# Patient Record
Sex: MALE | Race: ASIAN | NOT HISPANIC OR LATINO | URBAN - METROPOLITAN AREA
[De-identification: names, ages, dates, MRNs, and addresses within clinical notes are randomized per-mention and may not be internally consistent; named-entity substitution may affect disease eponyms.]

---

## 2019-12-13 VITALS
SYSTOLIC BLOOD PRESSURE: 130 MMHG | HEART RATE: 75 BPM | WEIGHT: 149.91 LBS | OXYGEN SATURATION: 97 % | TEMPERATURE: 97 F | RESPIRATION RATE: 16 BRPM | DIASTOLIC BLOOD PRESSURE: 77 MMHG | HEIGHT: 65 IN

## 2019-12-13 NOTE — H&P ADULT - HISTORY OF PRESENT ILLNESS
53yo male with PMHx of HTN, IDDM, MI 2/2016 (s/p PCI of LAD/OM, w/ residual RCA disease but never followed up) who presents to the cardiologist Dr. Mata with c/o chest pain.  Chest pain described as intermittent pain unrelated to activity, lasting minutes. ** confirm patients home medications    53 yo male, former smoker, PMHX HTN, hyperlipidemia, NIDDM type II, known CAD s/p STEMI in 2/20/16 tx with TIMMY to Daniella @  and presented to [facility] for staged PCI 11/28/16 receiving a TIMMY to OM1 w/ residual RCA disease and was recommended to return for PCI but patient was lost to follow up who now presents to Dr. Mata for routine follow up.  Per patient he has infrequent episodes of midsternal chest burning, unrelated to activity, which he relates to acid reflux.  Otherwise denies SOB, palpitations, diaphoresis, LE swelling, orthopnea, PND, fevers, chills.  No stress testing done.  In light of patients risk factors, and known RCA disease he now presents for cardiac catheterization and intervention of RCA disease.      Cath Hx  - s/p Cardiac Cath @ [facility] 11/28/2016: LM normal, LAD patent stent, OM 85%, mRCA 90%, EF 55, s/p successful TIMMY to OM1, recommended to return for staged PCI of RCA in 5 weeks. 55 yo male, former smoker, PMHX HTN, hyperlipidemia, NIDDM type II, known CAD s/p STEMI in 2/20/16 tx with TIMMY to Daniella @  and presented to [facility] for staged PCI 11/28/16 receiving a TIMMY to OM1 w/ residual RCA disease and was recommended to return for PCI but patient was lost to follow up who now presents to Dr. Mata for routine follow up.  Per patient he has infrequent episodes of midsternal chest burning, unrelated to activity, which he relates to acid reflux.  Otherwise denies SOB, palpitations, diaphoresis, LE swelling, orthopnea, PND, fevers, chills.  No stress testing done.  In light of patients risk factors, and known RCA disease he now presents for cardiac catheterization and intervention of RCA disease.      Cath Hx  - s/p Cardiac Cath @ [facility] 11/28/2016: LM normal, LAD patent stent, OM 85%, mRCA 90%, EF 55, s/p successful TIMMY to OM1, recommended to return for staged PCI of RCA in 5 weeks. 55 yo male, former smoker, PMHX HTN, hyperlipidemia, NIDDM type II, known CAD s/p STEMI in 2/20/16 tx with TIMMY to Daniella @  and presented to [facility] for staged PCI 11/28/16 receiving a TIMMY to OM1 w/ residual RCA disease and was recommended to return for PCI but patient was lost to follow up who now presents to Dr. Mata for routine follow up.  Per patient he has worsening episodes of midsternal chest burning, unrelated to activity, unstable angina.  Otherwise denies SOB, palpitations, diaphoresis, LE swelling, orthopnea, PND, fevers, chills.  No stress testing done.  In light of patients risk factors, unstable angina and known RCA disease he now presents for cardiac catheterization and intervention of RCA disease.      Cath Hx  - s/p Cardiac Cath @ [facility] 11/28/2016: LM normal, LAD patent stent, OM 85%, mRCA 90%, EF 55, s/p successful TIMMY to OM1, recommended to return for staged PCI of RCA in 5 weeks.

## 2019-12-13 NOTE — H&P ADULT - NSICDXPASTMEDICALHX_GEN_ALL_CORE_FT
PAST MEDICAL HISTORY:  CAD (coronary artery disease) s/p STEMI in 2/2016    Diabetes     Hyperlipidemia     Hypertension

## 2019-12-13 NOTE — H&P ADULT - NSHPLABSRESULTS_GEN_ALL_CORE
EKG: NSR @ 75 BPM with TWI in III, AVF 15.9   5.35  )-----------( 262      ( 17 Dec 2019 07:33 )             47.2       EKG: NSR @ 75 BPM with TWI in III, AVF

## 2019-12-13 NOTE — H&P ADULT - ASSESSMENT
55 yo male, former smoker, PMHX HTN, hyperlipidemia, NIDDM type II, known CAD s/p STEMI in 2/20/16 tx with TIMMY to Daniella @  and presented to [facility] for staged PCI 11/28/16 receiving a TIMMY to OM1 w/ residual RCA disease and was recommended to return for PCI but patient was lost to follow up who now presents to Dr. Mata for routine follow up.  In light of patients risk factors, and known RCA disease he now presents for cardiac catheterization and intervention of RCA disease.      ASA III, Mallampati IV    Pt denies bleeding, GI bleeding, hematemesis, hematuria, BRBPR or melena. Last took ASA/Effient on Bo 12/15/19 but otherwise endorses compliance with DAPT.   Stopped Atorvastatin 2 months ago 2/2 diarrhea. Patient states he was planning on discussing statin regimen with Dr. Mata post-cath.   EF 55% by prior cath 11/28/16:  IV NS@ 75 cc/hr pre-cath.  Sedation Plan: Moderate  Patient is Suitable Candidate for Sedation: Yes  Risks & benefits of procedure and alternative therapy have been explained to the patient including but not limited to: allergic reaction, bleeding w/possible need for blood transfusion, infection, renal and vascular compromise, limb damage, arrhythmia, stroke, vessel dissection/perforation, Myocardial infarction, emergent CABG. Informed consent obtained and in chart 53 yo male, former smoker, PMHX HTN, hyperlipidemia, NIDDM type II, known CAD s/p STEMI in 2/20/16 tx with TIMMY to Daniella @  and presented to [facility] for staged PCI 11/28/16 receiving a TIMMY to OM1 w/ residual RCA disease and was recommended to return for PCI but patient was lost to follow up. Per patient he has worsening episodes of midsternal chest burning, unrelated to activity, c/w unstable angina.  Otherwise denies SOB, palpitations, diaphoresis, LE swelling, orthopnea, PND, fevers, chills.  No stress testing done.  In light of patients risk factors, unstable angina and known RCA disease he now presents for cardiac catheterization and intervention of RCA disease.         ASA III, Mallampati IV    Pt denies bleeding, GI bleeding, hematemesis, hematuria, BRBPR or melena. Last took ASA/Effient on Bo 12/15/19 but otherwise endorses compliance with DAPT.   Stopped Atorvastatin 2 months ago 2/2 diarrhea. Patient states he was planning on discussing statin regimen with Dr. Mata post-cath.   EF 55% by prior cath 11/28/16:  IV NS@ 75 cc/hr pre-cath.  Sedation Plan: Moderate  Patient is Suitable Candidate for Sedation: Yes  Risks & benefits of procedure and alternative therapy have been explained to the patient including but not limited to: allergic reaction, bleeding w/possible need for blood transfusion, infection, renal and vascular compromise, limb damage, arrhythmia, stroke, vessel dissection/perforation, Myocardial infarction, emergent CABG. Informed consent obtained and in chart

## 2019-12-17 ENCOUNTER — INPATIENT (INPATIENT)
Facility: HOSPITAL | Age: 54
LOS: 0 days | Discharge: HOME | DRG: 247 | End: 2019-12-18
Attending: INTERNAL MEDICINE | Admitting: INTERNAL MEDICINE
Payer: COMMERCIAL

## 2019-12-17 DIAGNOSIS — Z90.49 ACQUIRED ABSENCE OF OTHER SPECIFIED PARTS OF DIGESTIVE TRACT: Chronic | ICD-10-CM

## 2019-12-17 LAB
ALBUMIN SERPL ELPH-MCNC: 4.6 G/DL — SIGNIFICANT CHANGE UP (ref 3.3–5)
ALP SERPL-CCNC: 68 U/L — SIGNIFICANT CHANGE UP (ref 40–120)
ALT FLD-CCNC: 135 U/L — HIGH (ref 10–45)
ANION GAP SERPL CALC-SCNC: 12 MMOL/L — SIGNIFICANT CHANGE UP (ref 5–17)
APTT BLD: 31.7 SEC — SIGNIFICANT CHANGE UP (ref 27.5–36.3)
AST SERPL-CCNC: 83 U/L — HIGH (ref 10–40)
BASOPHILS # BLD AUTO: 0.03 K/UL — SIGNIFICANT CHANGE UP (ref 0–0.2)
BASOPHILS NFR BLD AUTO: 0.6 % — SIGNIFICANT CHANGE UP (ref 0–2)
BILIRUB SERPL-MCNC: 0.6 MG/DL — SIGNIFICANT CHANGE UP (ref 0.2–1.2)
BUN SERPL-MCNC: 15 MG/DL — SIGNIFICANT CHANGE UP (ref 7–23)
CALCIUM SERPL-MCNC: 9.7 MG/DL — SIGNIFICANT CHANGE UP (ref 8.4–10.5)
CHLORIDE SERPL-SCNC: 99 MMOL/L — SIGNIFICANT CHANGE UP (ref 96–108)
CHOLEST SERPL-MCNC: 247 MG/DL — HIGH (ref 10–199)
CK MB CFR SERPL CALC: <1 NG/ML — SIGNIFICANT CHANGE UP (ref 0–6.7)
CK SERPL-CCNC: 116 U/L — SIGNIFICANT CHANGE UP (ref 30–200)
CO2 SERPL-SCNC: 27 MMOL/L — SIGNIFICANT CHANGE UP (ref 22–31)
CREAT SERPL-MCNC: 1.13 MG/DL — SIGNIFICANT CHANGE UP (ref 0.5–1.3)
EOSINOPHIL # BLD AUTO: 0.13 K/UL — SIGNIFICANT CHANGE UP (ref 0–0.5)
EOSINOPHIL NFR BLD AUTO: 2.4 % — SIGNIFICANT CHANGE UP (ref 0–6)
GLUCOSE BLDC GLUCOMTR-MCNC: 203 MG/DL — HIGH (ref 70–99)
GLUCOSE BLDC GLUCOMTR-MCNC: 230 MG/DL — HIGH (ref 70–99)
GLUCOSE BLDC GLUCOMTR-MCNC: 243 MG/DL — HIGH (ref 70–99)
GLUCOSE BLDC GLUCOMTR-MCNC: 248 MG/DL — HIGH (ref 70–99)
GLUCOSE BLDC GLUCOMTR-MCNC: 259 MG/DL — HIGH (ref 70–99)
GLUCOSE SERPL-MCNC: 265 MG/DL — HIGH (ref 70–99)
HBA1C BLD-MCNC: 9.9 % — HIGH (ref 4–5.6)
HCT VFR BLD CALC: 47.2 % — SIGNIFICANT CHANGE UP (ref 39–50)
HDLC SERPL-MCNC: 32 MG/DL — LOW
HGB BLD-MCNC: 15.9 G/DL — SIGNIFICANT CHANGE UP (ref 13–17)
IMM GRANULOCYTES NFR BLD AUTO: 0.2 % — SIGNIFICANT CHANGE UP (ref 0–1.5)
INR BLD: 1.08 — SIGNIFICANT CHANGE UP (ref 0.88–1.16)
LIPID PNL WITH DIRECT LDL SERPL: 157 MG/DL — HIGH
LYMPHOCYTES # BLD AUTO: 1.82 K/UL — SIGNIFICANT CHANGE UP (ref 1–3.3)
LYMPHOCYTES # BLD AUTO: 34 % — SIGNIFICANT CHANGE UP (ref 13–44)
MCHC RBC-ENTMCNC: 29.9 PG — SIGNIFICANT CHANGE UP (ref 27–34)
MCHC RBC-ENTMCNC: 33.7 GM/DL — SIGNIFICANT CHANGE UP (ref 32–36)
MCV RBC AUTO: 88.7 FL — SIGNIFICANT CHANGE UP (ref 80–100)
MONOCYTES # BLD AUTO: 0.5 K/UL — SIGNIFICANT CHANGE UP (ref 0–0.9)
MONOCYTES NFR BLD AUTO: 9.3 % — SIGNIFICANT CHANGE UP (ref 2–14)
NEUTROPHILS # BLD AUTO: 2.86 K/UL — SIGNIFICANT CHANGE UP (ref 1.8–7.4)
NEUTROPHILS NFR BLD AUTO: 53.5 % — SIGNIFICANT CHANGE UP (ref 43–77)
NRBC # BLD: 0 /100 WBCS — SIGNIFICANT CHANGE UP (ref 0–0)
PLATELET # BLD AUTO: 262 K/UL — SIGNIFICANT CHANGE UP (ref 150–400)
POTASSIUM SERPL-MCNC: 4.2 MMOL/L — SIGNIFICANT CHANGE UP (ref 3.5–5.3)
POTASSIUM SERPL-SCNC: 4.2 MMOL/L — SIGNIFICANT CHANGE UP (ref 3.5–5.3)
PROT SERPL-MCNC: 7.6 G/DL — SIGNIFICANT CHANGE UP (ref 6–8.3)
PROTHROM AB SERPL-ACNC: 12.2 SEC — SIGNIFICANT CHANGE UP (ref 10–12.9)
RBC # BLD: 5.32 M/UL — SIGNIFICANT CHANGE UP (ref 4.2–5.8)
RBC # FLD: 12.7 % — SIGNIFICANT CHANGE UP (ref 10.3–14.5)
SODIUM SERPL-SCNC: 138 MMOL/L — SIGNIFICANT CHANGE UP (ref 135–145)
TOTAL CHOLESTEROL/HDL RATIO MEASUREMENT: 7.7 RATIO — SIGNIFICANT CHANGE UP (ref 3.4–9.6)
TRIGL SERPL-MCNC: 289 MG/DL — HIGH (ref 10–149)
WBC # BLD: 5.35 K/UL — SIGNIFICANT CHANGE UP (ref 3.8–10.5)
WBC # FLD AUTO: 5.35 K/UL — SIGNIFICANT CHANGE UP (ref 3.8–10.5)

## 2019-12-17 PROCEDURE — 93458 L HRT ARTERY/VENTRICLE ANGIO: CPT | Mod: 26,59

## 2019-12-17 PROCEDURE — 93010 ELECTROCARDIOGRAM REPORT: CPT

## 2019-12-17 PROCEDURE — 92928 PRQ TCAT PLMT NTRAC ST 1 LES: CPT | Mod: 59,RC

## 2019-12-17 PROCEDURE — 93010 ELECTROCARDIOGRAM REPORT: CPT | Mod: 77

## 2019-12-17 RX ORDER — SODIUM CHLORIDE 9 MG/ML
500 INJECTION INTRAMUSCULAR; INTRAVENOUS; SUBCUTANEOUS
Refills: 0 | Status: DISCONTINUED | OUTPATIENT
Start: 2019-12-17 | End: 2019-12-17

## 2019-12-17 RX ORDER — INSULIN LISPRO 100/ML
VIAL (ML) SUBCUTANEOUS
Refills: 0 | Status: DISCONTINUED | OUTPATIENT
Start: 2019-12-17 | End: 2019-12-18

## 2019-12-17 RX ORDER — SODIUM CHLORIDE 9 MG/ML
1000 INJECTION, SOLUTION INTRAVENOUS
Refills: 0 | Status: DISCONTINUED | OUTPATIENT
Start: 2019-12-17 | End: 2019-12-17

## 2019-12-17 RX ORDER — INFLUENZA VIRUS VACCINE 15; 15; 15; 15 UG/.5ML; UG/.5ML; UG/.5ML; UG/.5ML
0.5 SUSPENSION INTRAMUSCULAR ONCE
Refills: 0 | Status: COMPLETED | OUTPATIENT
Start: 2019-12-17 | End: 2019-12-17

## 2019-12-17 RX ORDER — PRASUGREL 5 MG/1
60 TABLET, FILM COATED ORAL ONCE
Refills: 0 | Status: COMPLETED | OUTPATIENT
Start: 2019-12-17 | End: 2019-12-17

## 2019-12-17 RX ORDER — INSULIN LISPRO 100/ML
VIAL (ML) SUBCUTANEOUS ONCE
Refills: 0 | Status: COMPLETED | OUTPATIENT
Start: 2019-12-17 | End: 2019-12-17

## 2019-12-17 RX ORDER — METOPROLOL TARTRATE 50 MG
50 TABLET ORAL DAILY
Refills: 0 | Status: DISCONTINUED | OUTPATIENT
Start: 2019-12-17 | End: 2019-12-18

## 2019-12-17 RX ORDER — ACETAMINOPHEN 500 MG
650 TABLET ORAL ONCE
Refills: 0 | Status: COMPLETED | OUTPATIENT
Start: 2019-12-17 | End: 2019-12-17

## 2019-12-17 RX ORDER — DEXTROSE 50 % IN WATER 50 %
25 SYRINGE (ML) INTRAVENOUS ONCE
Refills: 0 | Status: DISCONTINUED | OUTPATIENT
Start: 2019-12-17 | End: 2019-12-17

## 2019-12-17 RX ORDER — GLUCAGON INJECTION, SOLUTION 0.5 MG/.1ML
1 INJECTION, SOLUTION SUBCUTANEOUS ONCE
Refills: 0 | Status: DISCONTINUED | OUTPATIENT
Start: 2019-12-17 | End: 2019-12-18

## 2019-12-17 RX ORDER — DEXTROSE 50 % IN WATER 50 %
12.5 SYRINGE (ML) INTRAVENOUS ONCE
Refills: 0 | Status: DISCONTINUED | OUTPATIENT
Start: 2019-12-17 | End: 2019-12-18

## 2019-12-17 RX ORDER — GLUCAGON INJECTION, SOLUTION 0.5 MG/.1ML
1 INJECTION, SOLUTION SUBCUTANEOUS ONCE
Refills: 0 | Status: DISCONTINUED | OUTPATIENT
Start: 2019-12-17 | End: 2019-12-17

## 2019-12-17 RX ORDER — SODIUM CHLORIDE 9 MG/ML
1000 INJECTION, SOLUTION INTRAVENOUS
Refills: 0 | Status: DISCONTINUED | OUTPATIENT
Start: 2019-12-17 | End: 2019-12-18

## 2019-12-17 RX ORDER — LISINOPRIL 2.5 MG/1
5 TABLET ORAL DAILY
Refills: 0 | Status: DISCONTINUED | OUTPATIENT
Start: 2019-12-18 | End: 2019-12-18

## 2019-12-17 RX ORDER — AMLODIPINE BESYLATE 2.5 MG/1
2.5 TABLET ORAL DAILY
Refills: 0 | Status: DISCONTINUED | OUTPATIENT
Start: 2019-12-17 | End: 2019-12-18

## 2019-12-17 RX ORDER — DEXTROSE 50 % IN WATER 50 %
12.5 SYRINGE (ML) INTRAVENOUS ONCE
Refills: 0 | Status: DISCONTINUED | OUTPATIENT
Start: 2019-12-17 | End: 2019-12-17

## 2019-12-17 RX ORDER — ASPIRIN/CALCIUM CARB/MAGNESIUM 324 MG
81 TABLET ORAL DAILY
Refills: 0 | Status: DISCONTINUED | OUTPATIENT
Start: 2019-12-18 | End: 2019-12-18

## 2019-12-17 RX ORDER — DEXTROSE 50 % IN WATER 50 %
25 SYRINGE (ML) INTRAVENOUS ONCE
Refills: 0 | Status: DISCONTINUED | OUTPATIENT
Start: 2019-12-17 | End: 2019-12-18

## 2019-12-17 RX ORDER — ATORVASTATIN CALCIUM 80 MG/1
80 TABLET, FILM COATED ORAL ONCE
Refills: 0 | Status: DISCONTINUED | OUTPATIENT
Start: 2019-12-17 | End: 2019-12-17

## 2019-12-17 RX ORDER — ATORVASTATIN CALCIUM 80 MG/1
80 TABLET, FILM COATED ORAL AT BEDTIME
Refills: 0 | Status: DISCONTINUED | OUTPATIENT
Start: 2019-12-17 | End: 2019-12-18

## 2019-12-17 RX ORDER — PRASUGREL 5 MG/1
10 TABLET, FILM COATED ORAL DAILY
Refills: 0 | Status: DISCONTINUED | OUTPATIENT
Start: 2019-12-18 | End: 2019-12-18

## 2019-12-17 RX ORDER — ASPIRIN/CALCIUM CARB/MAGNESIUM 324 MG
325 TABLET ORAL ONCE
Refills: 0 | Status: COMPLETED | OUTPATIENT
Start: 2019-12-17 | End: 2019-12-17

## 2019-12-17 RX ORDER — DEXTROSE 50 % IN WATER 50 %
15 SYRINGE (ML) INTRAVENOUS ONCE
Refills: 0 | Status: DISCONTINUED | OUTPATIENT
Start: 2019-12-17 | End: 2019-12-18

## 2019-12-17 RX ORDER — DEXTROSE 50 % IN WATER 50 %
15 SYRINGE (ML) INTRAVENOUS ONCE
Refills: 0 | Status: DISCONTINUED | OUTPATIENT
Start: 2019-12-17 | End: 2019-12-17

## 2019-12-17 RX ORDER — SODIUM CHLORIDE 9 MG/ML
500 INJECTION INTRAMUSCULAR; INTRAVENOUS; SUBCUTANEOUS
Refills: 0 | Status: DISCONTINUED | OUTPATIENT
Start: 2019-12-17 | End: 2019-12-18

## 2019-12-17 RX ADMIN — SODIUM CHLORIDE 75 MILLILITER(S): 9 INJECTION INTRAMUSCULAR; INTRAVENOUS; SUBCUTANEOUS at 12:10

## 2019-12-17 RX ADMIN — Medication 3: at 13:14

## 2019-12-17 RX ADMIN — Medication 650 MILLIGRAM(S): at 13:05

## 2019-12-17 RX ADMIN — SODIUM CHLORIDE 75 MILLILITER(S): 9 INJECTION INTRAMUSCULAR; INTRAVENOUS; SUBCUTANEOUS at 07:56

## 2019-12-17 RX ADMIN — Medication 50 MILLIGRAM(S): at 13:05

## 2019-12-17 RX ADMIN — ATORVASTATIN CALCIUM 80 MILLIGRAM(S): 80 TABLET, FILM COATED ORAL at 21:38

## 2019-12-17 RX ADMIN — Medication 650 MILLIGRAM(S): at 17:34

## 2019-12-17 RX ADMIN — Medication 325 MILLIGRAM(S): at 07:54

## 2019-12-17 RX ADMIN — Medication 650 MILLIGRAM(S): at 14:05

## 2019-12-17 RX ADMIN — Medication 2: at 17:33

## 2019-12-17 RX ADMIN — PRASUGREL 60 MILLIGRAM(S): 5 TABLET, FILM COATED ORAL at 07:55

## 2019-12-17 RX ADMIN — Medication 2: at 07:55

## 2019-12-17 RX ADMIN — AMLODIPINE BESYLATE 2.5 MILLIGRAM(S): 2.5 TABLET ORAL at 13:05

## 2019-12-17 RX ADMIN — Medication 2: at 21:38

## 2019-12-18 ENCOUNTER — TRANSCRIPTION ENCOUNTER (OUTPATIENT)
Age: 54
End: 2019-12-18

## 2019-12-18 VITALS
RESPIRATION RATE: 17 BRPM | DIASTOLIC BLOOD PRESSURE: 74 MMHG | OXYGEN SATURATION: 98 % | HEART RATE: 73 BPM | SYSTOLIC BLOOD PRESSURE: 124 MMHG

## 2019-12-18 LAB
ANION GAP SERPL CALC-SCNC: 8 MMOL/L — SIGNIFICANT CHANGE UP (ref 5–17)
BUN SERPL-MCNC: 17 MG/DL — SIGNIFICANT CHANGE UP (ref 7–23)
CALCIUM SERPL-MCNC: 9.4 MG/DL — SIGNIFICANT CHANGE UP (ref 8.4–10.5)
CHLORIDE SERPL-SCNC: 100 MMOL/L — SIGNIFICANT CHANGE UP (ref 96–108)
CO2 SERPL-SCNC: 26 MMOL/L — SIGNIFICANT CHANGE UP (ref 22–31)
CREAT SERPL-MCNC: 1.16 MG/DL — SIGNIFICANT CHANGE UP (ref 0.5–1.3)
GLUCOSE BLDC GLUCOMTR-MCNC: 230 MG/DL — HIGH (ref 70–99)
GLUCOSE BLDC GLUCOMTR-MCNC: 296 MG/DL — HIGH (ref 70–99)
GLUCOSE SERPL-MCNC: 268 MG/DL — HIGH (ref 70–99)
HCT VFR BLD CALC: 45.2 % — SIGNIFICANT CHANGE UP (ref 39–50)
HGB BLD-MCNC: 15.4 G/DL — SIGNIFICANT CHANGE UP (ref 13–17)
MAGNESIUM SERPL-MCNC: 2 MG/DL — SIGNIFICANT CHANGE UP (ref 1.6–2.6)
MCHC RBC-ENTMCNC: 29.9 PG — SIGNIFICANT CHANGE UP (ref 27–34)
MCHC RBC-ENTMCNC: 34.1 GM/DL — SIGNIFICANT CHANGE UP (ref 32–36)
MCV RBC AUTO: 87.8 FL — SIGNIFICANT CHANGE UP (ref 80–100)
NRBC # BLD: 0 /100 WBCS — SIGNIFICANT CHANGE UP (ref 0–0)
PLATELET # BLD AUTO: 243 K/UL — SIGNIFICANT CHANGE UP (ref 150–400)
POTASSIUM SERPL-MCNC: 4.1 MMOL/L — SIGNIFICANT CHANGE UP (ref 3.5–5.3)
POTASSIUM SERPL-SCNC: 4.1 MMOL/L — SIGNIFICANT CHANGE UP (ref 3.5–5.3)
RBC # BLD: 5.15 M/UL — SIGNIFICANT CHANGE UP (ref 4.2–5.8)
RBC # FLD: 12.7 % — SIGNIFICANT CHANGE UP (ref 10.3–14.5)
SODIUM SERPL-SCNC: 134 MMOL/L — LOW (ref 135–145)
WBC # BLD: 6.83 K/UL — SIGNIFICANT CHANGE UP (ref 3.8–10.5)
WBC # FLD AUTO: 6.83 K/UL — SIGNIFICANT CHANGE UP (ref 3.8–10.5)

## 2019-12-18 PROCEDURE — C1887: CPT

## 2019-12-18 PROCEDURE — 80061 LIPID PANEL: CPT

## 2019-12-18 PROCEDURE — 85610 PROTHROMBIN TIME: CPT

## 2019-12-18 PROCEDURE — 83036 HEMOGLOBIN GLYCOSYLATED A1C: CPT

## 2019-12-18 PROCEDURE — 99239 HOSP IP/OBS DSCHRG MGMT >30: CPT

## 2019-12-18 PROCEDURE — 82550 ASSAY OF CK (CPK): CPT

## 2019-12-18 PROCEDURE — 82962 GLUCOSE BLOOD TEST: CPT

## 2019-12-18 PROCEDURE — C1874: CPT

## 2019-12-18 PROCEDURE — 93005 ELECTROCARDIOGRAM TRACING: CPT

## 2019-12-18 PROCEDURE — 83735 ASSAY OF MAGNESIUM: CPT

## 2019-12-18 PROCEDURE — 85730 THROMBOPLASTIN TIME PARTIAL: CPT

## 2019-12-18 PROCEDURE — C1894: CPT

## 2019-12-18 PROCEDURE — 80053 COMPREHEN METABOLIC PANEL: CPT

## 2019-12-18 PROCEDURE — 36415 COLL VENOUS BLD VENIPUNCTURE: CPT

## 2019-12-18 PROCEDURE — 82553 CREATINE MB FRACTION: CPT

## 2019-12-18 PROCEDURE — 85025 COMPLETE CBC W/AUTO DIFF WBC: CPT

## 2019-12-18 PROCEDURE — 85027 COMPLETE CBC AUTOMATED: CPT

## 2019-12-18 PROCEDURE — C1769: CPT

## 2019-12-18 PROCEDURE — C1725: CPT

## 2019-12-18 PROCEDURE — 80048 BASIC METABOLIC PNL TOTAL CA: CPT

## 2019-12-18 RX ORDER — ROSUVASTATIN CALCIUM 5 MG/1
1 TABLET ORAL
Qty: 30 | Refills: 6
Start: 2019-12-18 | End: 2020-07-14

## 2019-12-18 RX ORDER — SITAGLIPTIN 50 MG/1
1 TABLET, FILM COATED ORAL
Qty: 30 | Refills: 3
Start: 2019-12-18 | End: 2020-04-15

## 2019-12-18 RX ORDER — METFORMIN HYDROCHLORIDE 850 MG/1
1 TABLET ORAL
Qty: 0 | Refills: 0 | DISCHARGE

## 2019-12-18 RX ORDER — ASPIRIN/CALCIUM CARB/MAGNESIUM 324 MG
1 TABLET ORAL
Qty: 30 | Refills: 11
Start: 2019-12-18 | End: 2020-12-11

## 2019-12-18 RX ORDER — PRASUGREL 5 MG/1
1 TABLET, FILM COATED ORAL
Qty: 30 | Refills: 11
Start: 2019-12-18 | End: 2020-12-11

## 2019-12-18 RX ADMIN — PRASUGREL 10 MILLIGRAM(S): 5 TABLET, FILM COATED ORAL at 11:48

## 2019-12-18 RX ADMIN — AMLODIPINE BESYLATE 2.5 MILLIGRAM(S): 2.5 TABLET ORAL at 05:19

## 2019-12-18 RX ADMIN — Medication 50 MILLIGRAM(S): at 05:19

## 2019-12-18 RX ADMIN — Medication 81 MILLIGRAM(S): at 11:39

## 2019-12-18 RX ADMIN — Medication 2: at 07:11

## 2019-12-18 RX ADMIN — Medication 3: at 11:48

## 2019-12-18 NOTE — DISCHARGE NOTE PROVIDER - HOSPITAL COURSE
53 yo male, former smoker, PMHX HTN, hyperlipidemia, NIDDM type II, known CAD s/p STEMI in 2/20/16 tx with TIMMY to proxLALANRE @  and presented to [facility] for staged PCI 11/28/16 receiving a TIMMY to OM1 w/ residual RCA disease and was recommended to return for PCI but patient was lost to follow up who now presents to Dr. Mata for routine follow up.  Per patient he has worsening episodes of midsternal chest burning, unrelated to activity, unstable angina.  Otherwise denies SOB, palpitations, diaphoresis, LE swelling, orthopnea, PND, fevers, chills.  No stress testing done.  In light of patients risk factors, unstable angina and known RCA disease he now presents for cardiac catheterization and intervention of RCA disease.      Pt s/p cardiac catheterization receiving TIMMY x 2 to mRCA and pRCA with patent LAD stent, EF 55%, access R radial. Pt started on Crestor 20mg at discharge, as pt has an intolerance to Atorvastatin. Endocrinology consulted for an A1C of 9.9%, _______________. Pt seen and examined at bedside this AM without any complaints or events overnight, access site stable, non TTP, without ecchymosis, bleeding or hematoma, pulse 2+. VSS, labs and telemetry reviewed and pt stable for discharge as discussed with Dr. Wyatt. Pt has received appropriate discharge instructions, including medication regimen, access site management and follow up with Dr. Mata in 1-2 weeks. 53 yo male, former smoker, PMHX HTN, hyperlipidemia, NIDDM type II, known CAD s/p STEMI in 2/20/16 tx with TIMMY to proxLUCIANA @  and presented to [facility] for staged PCI 11/28/16 receiving a TIMMY to OM1 w/ residual RCA disease and was recommended to return for PCI but patient was lost to follow up who now presents to Dr. Mata for routine follow up.  Per patient he has worsening episodes of midsternal chest burning, unrelated to activity, unstable angina.  Otherwise denies SOB, palpitations, diaphoresis, LE swelling, orthopnea, PND, fevers, chills.  No stress testing done.  In light of patients risk factors, unstable angina and known RCA disease he now presents for cardiac catheterization and intervention of RCA disease.      Pt s/p cardiac catheterization receiving TIMMY x 2 to mRCA and pRCA with patent LAD stent, EF 55%, access R radial. Pt started on Crestor 20mg at discharge, as pt has an intolerance to Atorvastatin.  A1C of 9.9%, pt has not been taking Januvia at home x 2months as he ran out. Pt will follow up with Dr. Mata in 1-2 weeks and have diabetes management discussed. Pt seen and examined at bedside this AM without any complaints or events overnight, access site stable, non TTP, without ecchymosis, bleeding or hematoma, pulse 2+. VSS, labs and telemetry reviewed and pt stable for discharge as discussed with Dr. Wyatt. Pt has received appropriate discharge instructions, including medication regimen, access site management and follow up with Dr. Mata in 1-2 weeks.

## 2019-12-18 NOTE — DISCHARGE NOTE NURSING/CASE MANAGEMENT/SOCIAL WORK - PATIENT PORTAL LINK FT
You can access the FollowMyHealth Patient Portal offered by [facility] by registering at the following website: [email or web address]. By joining [employer]’s FollowMyHealth portal, you will also be able to view your health information using other applications (apps) compatible with our system.

## 2019-12-18 NOTE — DISCHARGE NOTE PROVIDER - NSDCACTIVITY_GEN_ALL_CORE
No heavy lifting/straining/Showering allowed Walking - Indoors allowed/Walking - Outdoors allowed/No heavy lifting/straining/Showering allowed

## 2019-12-18 NOTE — DISCHARGE NOTE PROVIDER - NSDCCPCAREPLAN_GEN_ALL_CORE_FT
PRINCIPAL DISCHARGE DIAGNOSIS  Diagnosis: CAD (coronary artery disease)  Assessment and Plan of Treatment: You were found to have blockages in the arteries of your heart, also known as Coronary Artery Diseease. You underwent a cardiac angiogram and received two stents to the Right Coronary artery. PLEASE CONTINUE ASPIRIN 81MG DAILY AND PLAVIX 75MG DAILY. DO NOT STOP THESE MEDICATIONS FOR ANY REASON AS THEY ARE KEEPING YOUR STENT OPEN AND PREVENTING A HEART ATTACK. Avoid strenuous activity or heavy lifting for the next five days. Do not take a bath or swim for the next five days; you may shower. For any bleeding or hematoma formation (hardened blood collection under the skin) at the access site of your right wrist please hold pressure and go to the emergency room. Please follow up with Dr. Mata in 1-2 weeks. For recurrent chest pain, please call your doctor or go to the emergency room.      SECONDARY DISCHARGE DIAGNOSES  Diagnosis: HTN (hypertension)  Assessment and Plan of Treatment: Please continue your medications as listed to keep your blood pressure controlled. For blood pressure that is too high or too low please see your doctor or go to the emergency room as necessary.      Diagnosis: HLD (hyperlipidemia)  Assessment and Plan of Treatment: Please START CRESTOR 20MG DAILY to keep your cholesterol low. High cholesterol contributes to heart disease.    Diagnosis: DM (diabetes mellitus)  Assessment and Plan of Treatment: Please continue _________ your medications as listed for diabetes. Maintain a low carbohydrate, low sugar diet. Check for your blood sugars regularly. PRINCIPAL DISCHARGE DIAGNOSIS  Diagnosis: CAD (coronary artery disease)  Assessment and Plan of Treatment: You were found to have blockages in the arteries of your heart, also known as Coronary Artery Diseease. You underwent a cardiac angiogram and received two stents to the Right Coronary artery. PLEASE CONTINUE ASPIRIN 81MG DAILY AND Effient (prasugrel) 10mg DAILY. DO NOT STOP THESE MEDICATIONS FOR ANY REASON AS THEY ARE KEEPING YOUR STENT OPEN AND PREVENTING A HEART ATTACK. Avoid strenuous activity or heavy lifting for the next five days. Do not take a bath or swim for the next five days; you may shower. For any bleeding or hematoma formation (hardened blood collection under the skin) at the access site of your right wrist please hold pressure and go to the emergency room. Please follow up with Dr. Mata in 1-2 weeks. For recurrent chest pain, please call your doctor or go to the emergency room.      SECONDARY DISCHARGE DIAGNOSES  Diagnosis: HTN (hypertension)  Assessment and Plan of Treatment: Please continue your amlodipine, metoprolol succinate ER, ramipril to keep your blood pressure controlled. For blood pressure that is too high or too low please see your doctor or go to the emergency room as necessary.      Diagnosis: HLD (hyperlipidemia)  Assessment and Plan of Treatment: Please START CRESTOR 20MG DAILY to keep your cholesterol low. High cholesterol contributes to heart disease.    Diagnosis: DM (diabetes mellitus)  Assessment and Plan of Treatment: Please continue Januvia and metformin for diabetes. Maintain a low carbohydrate, low sugar diet. Check for your blood sugars regularly. Follow up with Dr. Mata in 1-2 weeks to discuss diabetes management.

## 2019-12-18 NOTE — DISCHARGE NOTE PROVIDER - NSDCFUADDINST_GEN_ALL_CORE_FT
You underwent a coronary angiogram and should wait 3 days before returning to ordinary activities. Do not drive for 2 days. Consult your doctor before returning to vigorous activity. You may return to work in 3-5 days. The catheter and dressing from your wrist was removed and you may shower as usual.  If you notice bleeding from the site, hardening and pain at the site, drainage or redness from the site, coolness/paleness of the extremity, swelling, or fever, please call [phone]. Please continue your aspirin and effient as prescribed unless otherwise indicated by your cardiologist.

## 2019-12-18 NOTE — DISCHARGE NOTE PROVIDER - NSDCMRMEDTOKEN_GEN_ALL_CORE_FT
amLODIPine 2.5 mg oral tablet: 1 tab(s) orally once a day  aspirin 81 mg oral tablet: 1 tab(s) orally once a day  atorvastatin 80 mg oral tablet: 1 tab(s) orally once a day  Effient 10 mg oral tablet: 1 tab(s) orally once a day  Januvia 100 mg oral tablet: 1 tab(s) orally once a day  metFORMIN 1000 mg oral tablet: 1 tab(s) orally 2 times a day  ramipril 1.25 mg oral capsule: 1 cap(s) orally once a day  Toprol-XL 50 mg oral tablet, extended release: 1 tab(s) orally once a day amLODIPine 2.5 mg oral tablet: 1 tab(s) orally once a day  aspirin 81 mg oral tablet: 1 tab(s) orally once a day  Effient 10 mg oral tablet: 1 tab(s) orally once a day  Januvia 100 mg oral tablet: 1 tab(s) orally once a day  metFORMIN 1000 mg oral tablet: 1 tab(s) orally 2 times a day  ramipril 1.25 mg oral capsule: 1 cap(s) orally once a day  rosuvastatin 20 mg oral tablet: 1 tab(s) orally once a day (at bedtime)   Toprol-XL 50 mg oral tablet, extended release: 1 tab(s) orally once a day amLODIPine 2.5 mg oral tablet: 1 tab(s) orally once a day  aspirin 81 mg oral tablet: 1 tab(s) orally once a day  Effient 10 mg oral tablet: 1 tab(s) orally once a day  Januvia 100 mg oral tablet: 1 tab(s) orally once a day  metFORMIN 1000 mg oral tablet: 1 tab(s) orally 2 times a day  HOLD for 48 hours. Restart on 12/20/19  ramipril 1.25 mg oral capsule: 1 cap(s) orally once a day  rosuvastatin 20 mg oral tablet: 1 tab(s) orally once a day (at bedtime)   Toprol-XL 50 mg oral tablet, extended release: 1 tab(s) orally once a day

## 2019-12-18 NOTE — DISCHARGE NOTE PROVIDER - CARE PROVIDER_API CALL
Jerad Mata)  Cardiovascular Disease; Internal Medicine; Interventional Cardiology  [address], [facility] [address]  [place], [place] [address]  Phone: [phone]  Fax: [phone]  Follow Up Time:

## 2019-12-23 DIAGNOSIS — I25.110 ATHEROSCLEROTIC HEART DISEASE OF NATIVE CORONARY ARTERY WITH UNSTABLE ANGINA PECTORIS: ICD-10-CM

## 2019-12-23 DIAGNOSIS — I25.10 ATHEROSCLEROTIC HEART DISEASE OF NATIVE CORONARY ARTERY WITHOUT ANGINA PECTORIS: ICD-10-CM

## 2019-12-23 DIAGNOSIS — I25.2 OLD MYOCARDIAL INFARCTION: ICD-10-CM

## 2019-12-23 DIAGNOSIS — Z95.5 PRESENCE OF CORONARY ANGIOPLASTY IMPLANT AND GRAFT: ICD-10-CM

## 2019-12-23 DIAGNOSIS — E78.5 HYPERLIPIDEMIA, UNSPECIFIED: ICD-10-CM

## 2019-12-23 DIAGNOSIS — Z87.891 PERSONAL HISTORY OF NICOTINE DEPENDENCE: ICD-10-CM

## 2019-12-23 DIAGNOSIS — E11.9 TYPE 2 DIABETES MELLITUS WITHOUT COMPLICATIONS: ICD-10-CM

## 2019-12-23 DIAGNOSIS — Z91.14 PATIENT'S OTHER NONCOMPLIANCE WITH MEDICATION REGIMEN: ICD-10-CM

## 2019-12-23 DIAGNOSIS — I10 ESSENTIAL (PRIMARY) HYPERTENSION: ICD-10-CM

## 2019-12-23 DIAGNOSIS — Z79.84 LONG TERM (CURRENT) USE OF ORAL HYPOGLYCEMIC DRUGS: ICD-10-CM

## 2020-10-26 PROBLEM — E11.9 TYPE 2 DIABETES MELLITUS WITHOUT COMPLICATIONS: Chronic | Status: ACTIVE | Noted: 2019-12-13

## 2020-10-29 DIAGNOSIS — Z01.818 ENCOUNTER FOR OTHER PREPROCEDURAL EXAMINATION: ICD-10-CM

## 2020-10-31 ENCOUNTER — APPOINTMENT (OUTPATIENT)
Dept: DISASTER EMERGENCY | Facility: CLINIC | Age: 55
End: 2020-10-31

## 2020-11-01 LAB — SARS-COV-2 N GENE NPH QL NAA+PROBE: NOT DETECTED

## 2020-11-02 VITALS
SYSTOLIC BLOOD PRESSURE: 130 MMHG | OXYGEN SATURATION: 97 % | DIASTOLIC BLOOD PRESSURE: 79 MMHG | WEIGHT: 154.98 LBS | HEART RATE: 62 BPM | HEIGHT: 65 IN

## 2020-11-02 NOTE — H&P ADULT - NSICDXPASTMEDICALHX_GEN_ALL_CORE_FT
PAST MEDICAL HISTORY:  CAD (coronary artery disease) s/p STEMI in 2/2016    Diabetes     Hyperlipidemia     Hypertension     ST elevation MI (STEMI)

## 2020-11-02 NOTE — H&P ADULT - HISTORY OF PRESENT ILLNESS
COVID: negative (10/31/20 in HIE)  Escort:  Pharmacy:      54yo Male, former smoker, with PMHx HTN, HLD, CAD c/b STEMI 2/2016 at [facility] s/p multiple PCIs (most recently 12/17/19 with TIMMY pRCA, TIMMY dRCA with patent LAD stent and patent OM1 stent) and DM, who presented to his Cardiologist Dr. Mata for routine visit. He endorses occasional chest pain and HATFIELD x ___. Pt denies any ___ CP, palpitations, dizziness, syncope, diaphoresis, fatigue, LE edema, SOB, HATFIELD, orthopnea, PND, N/V/D, abd pain, cough, congestion, fever, chills or recent sick contact. The pt had Exercise Stress Test (10/6/20) revealing EF 55% with small amount of ischemia in the basal to mid inferior, inferolateral regions.    In light of pts risk factors, CCS class __ anginal symptoms and abnormal Exercise Stress Test, pt now presents to [facility] for recommended cardiac catheterization with possible intervention if clinically indicated. COVID: negative (10/31/20 in HIE)    **SKELETON**    54yo Male, former smoker, with PMHx HTN, HLD, CAD c/b STEMI 2/2016 at [facility] s/p multiple PCIs (most recently 12/17/19 with TIMMY pRCA, TIMMY dRCA with patent LAD stent and patent OM1 stent) and DM, who presented to his Cardiologist Dr. Mata for routine visit. He endorses occasional chest pain and HATFIELD x ___. Pt denies any ___ CP, palpitations, dizziness, syncope, diaphoresis, fatigue, LE edema, SOB, HATFIELD, orthopnea, PND, N/V/D, abd pain, cough, congestion, fever, chills or recent sick contact. The pt had Exercise Stress Test (10/6/20) revealing EF 55% with small amount of ischemia in the basal to mid inferior, inferolateral regions.    In light of pts risk factors, CCS class __ anginal symptoms and abnormal Exercise Stress Test, pt now presents to [facility] for recommended cardiac catheterization with possible intervention if clinically indicated. COVID: negative (10/31/20 in HIE)    56yo Male, former smoker, with PMHx HTN, HLD, CAD c/b STEMI 2/2016 at [facility] s/p multiple PCIs (most recently 12/17/19 with TIMMY pRCA, TIMMY dRCA with patent LAD stent and patent OM1 stent) and DM, who presented to his Cardiologist Dr. Mata for routine visit c/o occasional episode of substernal chest burning with brisk walking or going up flight of stairs over the past month. He also endorses associated L elbow numbness/tingling and SOB, that both resolve when chest discomfort resolves. Pt reports that these symptoms are much milder than when he presented with STEMI. He denies any palpitations, dizziness, syncope, diaphoresis, fatigue, LE edema, orthopnea, PND, N/V/D, abd pain, cough, congestion, fever, chills or recent sick contact. The pt had Exercise Stress Test (10/6/20) revealing EF 55% with small amount of ischemia in the basal to mid inferior, inferolateral regions.    In light of pts risk factors, CCS class II/III anginal symptoms and abnormal Exercise Stress Test, pt now presents to [facility] for recommended cardiac catheterization with possible intervention if clinically indicated. COVID: negative (10/31/20 in HIE)  Pharmacy: [facility] at [address] [place] [address]  Escort: Family  Cards: Dr. Mata    54yo Male, former smoker, with PMHx HTN, HLD, CAD c/b STEMI 2/2016 at [facility] s/p multiple PCIs (most recently 12/17/19 with TIMMY pRCA, TIMMY dRCA with patent LAD stent and patent OM1 stent) and DM, who presented to his Cardiologist Dr. Mata for routine visit c/o occasional episode of substernal chest burning with brisk walking or going up flight of stairs over the past month. He also endorses associated L elbow numbness/tingling and SOB, that both resolve when chest discomfort resolves. Pt reports that these symptoms are much milder than when he presented with STEMI. He denies any palpitations, dizziness, syncope, diaphoresis, fatigue, LE edema, orthopnea, PND, N/V/D, abd pain, cough, congestion, fever, chills or recent sick contact. The pt had Exercise Stress Test (10/6/20) revealing EF 55% with small amount of ischemia in the basal to mid inferior, inferolateral regions.    In light of pts risk factors, CCS class II/III anginal symptoms and abnormal Exercise Stress Test, pt now presents to [facility] for recommended cardiac catheterization with possible intervention if clinically indicated. COVID: negative (10/31/20 in HIE)  Pharmacy: [facility] at [address] [place] [address]  Escort: Family  Cards: Dr. Mata    56yo Male, former smoker, with PMHx HTN, HLD, CAD c/b STEMI 2/2016 at [facility] s/p multiple PCIs (most recently 12/17/19 with TIMMY pRCA, TIMMY dRCA with patent LAD stent and patent OM1 stent) and DM, who presented to his Cardiologist Dr. Mata for routine visit c/o occasional episode of substernal chest burning with brisk walking or going up flight of stairs over the past month. He also endorses associated L elbow numbness/tingling and SOB, that both resolve when chest discomfort resolves. Pt reports that these symptoms are much milder than when he presented with STEMI. He denies any palpitations, dizziness, syncope, diaphoresis, fatigue, LE edema, orthopnea, PND, N/V/D, abd pain, cough, congestion, fever, chills or recent sick contact. The pt had Exercise Stress Test (10/6/20) revealing EF 55% with small amount of ischemia in the basal to mid inferior, inferolateral regions.    In light of pts risk factors, CCS class III anginal symptoms and abnormal Exercise Stress Test, pt now presents to [facility] for recommended cardiac catheterization with possible intervention if clinically indicated.

## 2020-11-02 NOTE — H&P ADULT - ASSESSMENT
54yo Male, former smoker, with PMHx HTN, HLD, CAD c/b STEMI 2/2016 at [facility] s/p multiple PCIs (most recently 12/17/19 with TIMMY pRCA, TIMMY dRCA with patent LAD stent and patent OM1 stent) and DM, who presented to his Cardiologist Dr. Mata for routine visit c/o occasional episode of substernal chest burning with brisk walking or going up flight of stairs over the past month. He also endorses associated L elbow numbness/tingling and SOB, that both resolve when chest discomfort resolves. Pt reports that these symptoms are much milder than when he presented with STEMI. He denies any palpitations, dizziness, syncope, diaphoresis, fatigue, LE edema, orthopnea, PND, N/V/D, abd pain, cough, congestion, fever, chills or recent sick contact. The pt had Exercise Stress Test (10/6/20) revealing EF 55% with small amount of ischemia in the basal to mid inferior, inferolateral regions. In light of pts risk factors, CCS class II/III anginal symptoms and abnormal Exercise Stress Test, pt now presents to [facility] for recommended cardiac catheterization with possible intervention if clinically indicated.     -H/H = 15.6/45.4. Pt denies BRBPR, hematuria, hematochezia, melena. Pt compliant with daily 10mg Effient and 81mg ASA however did not take home dose today. Will be give 10mg Effient x1 and 81mg ASA x1 pre procedure  -Cr = _____. EF normal. Euvolemic on exam. IVF @ 75cc/hr started pre procedure  -Type of sedation: moderate  -Candidate for sedation: yes     Risks & benefits of procedure and alternative therapy have been explained to the patient including but not limited to: allergic reaction, bleeding w/possible need for blood transfusion, infection, renal and vascular compromise, limb damage, arrhythmia, stroke, vessel dissection/perforation, Myocardial infarction, emergent CABG. Informed consent obtained and in chart. 56yo Male, former smoker, with PMHx HTN, HLD, CAD c/b STEMI 2/2016 at [facility] s/p multiple PCIs (most recently 12/17/19 with TIMMY pRCA, TIMMY dRCA with patent LAD stent and patent OM1 stent) and DM, who presented to his Cardiologist Dr. Mata for routine visit c/o occasional episode of substernal chest burning with brisk walking or going up flight of stairs over the past month. He also endorses associated L elbow numbness/tingling and SOB, that both resolve when chest discomfort resolves. Pt reports that these symptoms are much milder than when he presented with STEMI. He denies any palpitations, dizziness, syncope, diaphoresis, fatigue, LE edema, orthopnea, PND, N/V/D, abd pain, cough, congestion, fever, chills or recent sick contact. The pt had Exercise Stress Test (10/6/20) revealing EF 55% with small amount of ischemia in the basal to mid inferior, inferolateral regions. In light of pts risk factors, CCS class II/III anginal symptoms and abnormal Exercise Stress Test, pt now presents to [facility] for recommended cardiac catheterization with possible intervention if clinically indicated.     -H/H = 15.6/45.4. Pt denies BRBPR, hematuria, hematochezia, melena. Pt compliant with daily 10mg Effient and 81mg ASA however did not take home dose today. Will be give 10mg Effient x1 and 81mg ASA x1 pre procedure  -Cr = 1.26. EF normal. Euvolemic on exam. IVF @ 75cc/hr started pre procedure  -Type of sedation: moderate  -Candidate for sedation: yes     Risks & benefits of procedure and alternative therapy have been explained to the patient including but not limited to: allergic reaction, bleeding w/possible need for blood transfusion, infection, renal and vascular compromise, limb damage, arrhythmia, stroke, vessel dissection/perforation, Myocardial infarction, emergent CABG. Informed consent obtained and in chart. 56yo Male, former smoker, with PMHx HTN, HLD, CAD c/b STEMI 2/2016 at [facility] s/p multiple PCIs (most recently 12/17/19 with TIMMY pRCA, TIMMY dRCA with patent LAD stent and patent OM1 stent) and DM, who presented to his Cardiologist Dr. Mata for routine visit c/o occasional episode of substernal chest burning with brisk walking or going up flight of stairs over the past month. He also endorses associated L elbow numbness/tingling and SOB, that both resolve when chest discomfort resolves. Pt reports that these symptoms are much milder than when he presented with STEMI. He denies any palpitations, dizziness, syncope, diaphoresis, fatigue, LE edema, orthopnea, PND, N/V/D, abd pain, cough, congestion, fever, chills or recent sick contact. The pt had Exercise Stress Test (10/6/20) revealing EF 55% with small amount of ischemia in the basal to mid inferior, inferolateral regions. In light of pts risk factors, CCS class III anginal symptoms and abnormal Exercise Stress Test, pt now presents to [facility] for recommended cardiac catheterization with possible intervention if clinically indicated.     -H/H = 15.6/45.4. Pt denies BRBPR, hematuria, hematochezia, melena. Pt compliant with daily 10mg Effient and 81mg ASA however did not take home dose today. Will be give 10mg Effient x1 and 81mg ASA x1 pre procedure  -Cr = 1.26. EF normal. Euvolemic on exam. IVF @ 75cc/hr started pre procedure  -Type of sedation: moderate  -Candidate for sedation: yes     Risks & benefits of procedure and alternative therapy have been explained to the patient including but not limited to: allergic reaction, bleeding w/possible need for blood transfusion, infection, renal and vascular compromise, limb damage, arrhythmia, stroke, vessel dissection/perforation, Myocardial infarction, emergent CABG. Informed consent obtained and in chart.

## 2020-11-02 NOTE — H&P ADULT - GASTROINTESTINAL DETAILS
soft/nontender/no guarding/no rebound tenderness/no rigidity/bowel sounds normal/no distention/normal

## 2020-11-02 NOTE — H&P ADULT - NEUROLOGICAL DETAILS
responds to verbal commands/sensation intact/no spontaneous movement/responds to pain/alert and oriented x 3/normal strength

## 2020-11-02 NOTE — H&P ADULT - RS GEN PE MLT RESP DETAILS PC
no rhonchi/airway patent/clear to auscultation bilaterally/no rales/normal/respirations non-labored/breath sounds equal/good air movement

## 2020-11-02 NOTE — H&P ADULT - ADDITIONAL PE
ASA III, Mallampati III  ECG: ASA III, Mallampati III  ECG: NSR @ 66 BPM with TWI in III. No other ST changes or TWI

## 2020-11-03 ENCOUNTER — INPATIENT (INPATIENT)
Facility: HOSPITAL | Age: 55
LOS: 0 days | Discharge: HOME | DRG: 247 | End: 2020-11-04
Attending: INTERNAL MEDICINE | Admitting: INTERNAL MEDICINE
Payer: COMMERCIAL

## 2020-11-03 DIAGNOSIS — Z90.49 ACQUIRED ABSENCE OF OTHER SPECIFIED PARTS OF DIGESTIVE TRACT: Chronic | ICD-10-CM

## 2020-11-03 LAB
A1C WITH ESTIMATED AVERAGE GLUCOSE RESULT: 8.4 % — HIGH (ref 4–5.6)
ALBUMIN SERPL ELPH-MCNC: 5.1 G/DL — HIGH (ref 3.3–5)
ALP SERPL-CCNC: 62 U/L — SIGNIFICANT CHANGE UP (ref 40–120)
ALT FLD-CCNC: 54 U/L — HIGH (ref 10–45)
ANION GAP SERPL CALC-SCNC: 15 MMOL/L — SIGNIFICANT CHANGE UP (ref 5–17)
APTT BLD: 35 SEC — SIGNIFICANT CHANGE UP (ref 27.5–35.5)
AST SERPL-CCNC: 36 U/L — SIGNIFICANT CHANGE UP (ref 10–40)
BASOPHILS # BLD AUTO: 0.04 K/UL — SIGNIFICANT CHANGE UP (ref 0–0.2)
BASOPHILS NFR BLD AUTO: 0.6 % — SIGNIFICANT CHANGE UP (ref 0–2)
BILIRUB SERPL-MCNC: 0.7 MG/DL — SIGNIFICANT CHANGE UP (ref 0.2–1.2)
BUN SERPL-MCNC: 18 MG/DL — SIGNIFICANT CHANGE UP (ref 7–23)
CALCIUM SERPL-MCNC: 9.9 MG/DL — SIGNIFICANT CHANGE UP (ref 8.4–10.5)
CHLORIDE SERPL-SCNC: 98 MMOL/L — SIGNIFICANT CHANGE UP (ref 96–108)
CHOLEST SERPL-MCNC: 134 MG/DL — SIGNIFICANT CHANGE UP
CK MB CFR SERPL CALC: 1.3 NG/ML — SIGNIFICANT CHANGE UP (ref 0–6.7)
CK SERPL-CCNC: 135 U/L — SIGNIFICANT CHANGE UP (ref 30–200)
CO2 SERPL-SCNC: 26 MMOL/L — SIGNIFICANT CHANGE UP (ref 22–31)
CREAT SERPL-MCNC: 1.26 MG/DL — SIGNIFICANT CHANGE UP (ref 0.5–1.3)
EOSINOPHIL # BLD AUTO: 0.2 K/UL — SIGNIFICANT CHANGE UP (ref 0–0.5)
EOSINOPHIL NFR BLD AUTO: 3.2 % — SIGNIFICANT CHANGE UP (ref 0–6)
ESTIMATED AVERAGE GLUCOSE: 194 MG/DL — HIGH (ref 68–114)
GLUCOSE BLDC GLUCOMTR-MCNC: 130 MG/DL — HIGH (ref 70–99)
GLUCOSE BLDC GLUCOMTR-MCNC: 155 MG/DL — HIGH (ref 70–99)
GLUCOSE SERPL-MCNC: 178 MG/DL — HIGH (ref 70–99)
HCT VFR BLD CALC: 45.4 % — SIGNIFICANT CHANGE UP (ref 39–50)
HDLC SERPL-MCNC: 32 MG/DL — LOW
HGB BLD-MCNC: 15.6 G/DL — SIGNIFICANT CHANGE UP (ref 13–17)
IMM GRANULOCYTES NFR BLD AUTO: 0.3 % — SIGNIFICANT CHANGE UP (ref 0–1.5)
INR BLD: 1.06 — SIGNIFICANT CHANGE UP (ref 0.88–1.16)
LIPID PNL WITH DIRECT LDL SERPL: 54 MG/DL — SIGNIFICANT CHANGE UP
LYMPHOCYTES # BLD AUTO: 2.2 K/UL — SIGNIFICANT CHANGE UP (ref 1–3.3)
LYMPHOCYTES # BLD AUTO: 35 % — SIGNIFICANT CHANGE UP (ref 13–44)
MCHC RBC-ENTMCNC: 29.4 PG — SIGNIFICANT CHANGE UP (ref 27–34)
MCHC RBC-ENTMCNC: 34.4 GM/DL — SIGNIFICANT CHANGE UP (ref 32–36)
MCV RBC AUTO: 85.5 FL — SIGNIFICANT CHANGE UP (ref 80–100)
MONOCYTES # BLD AUTO: 0.51 K/UL — SIGNIFICANT CHANGE UP (ref 0–0.9)
MONOCYTES NFR BLD AUTO: 8.1 % — SIGNIFICANT CHANGE UP (ref 2–14)
NEUTROPHILS # BLD AUTO: 3.31 K/UL — SIGNIFICANT CHANGE UP (ref 1.8–7.4)
NEUTROPHILS NFR BLD AUTO: 52.8 % — SIGNIFICANT CHANGE UP (ref 43–77)
NON HDL CHOLESTEROL: 102 MG/DL — SIGNIFICANT CHANGE UP
NRBC # BLD: 0 /100 WBCS — SIGNIFICANT CHANGE UP (ref 0–0)
PLATELET # BLD AUTO: 260 K/UL — SIGNIFICANT CHANGE UP (ref 150–400)
POTASSIUM SERPL-MCNC: 3.9 MMOL/L — SIGNIFICANT CHANGE UP (ref 3.5–5.3)
POTASSIUM SERPL-SCNC: 3.9 MMOL/L — SIGNIFICANT CHANGE UP (ref 3.5–5.3)
PROT SERPL-MCNC: 8.7 G/DL — HIGH (ref 6–8.3)
PROTHROM AB SERPL-ACNC: 12.7 SEC — SIGNIFICANT CHANGE UP (ref 10.6–13.6)
RBC # BLD: 5.31 M/UL — SIGNIFICANT CHANGE UP (ref 4.2–5.8)
RBC # FLD: 13.2 % — SIGNIFICANT CHANGE UP (ref 10.3–14.5)
SODIUM SERPL-SCNC: 139 MMOL/L — SIGNIFICANT CHANGE UP (ref 135–145)
TRIGL SERPL-MCNC: 240 MG/DL — HIGH
WBC # BLD: 6.28 K/UL — SIGNIFICANT CHANGE UP (ref 3.8–10.5)
WBC # FLD AUTO: 6.28 K/UL — SIGNIFICANT CHANGE UP (ref 3.8–10.5)

## 2020-11-03 PROCEDURE — 93458 L HRT ARTERY/VENTRICLE ANGIO: CPT | Mod: 26,59

## 2020-11-03 PROCEDURE — 93010 ELECTROCARDIOGRAM REPORT: CPT | Mod: 76

## 2020-11-03 PROCEDURE — 92978 ENDOLUMINL IVUS OCT C 1ST: CPT | Mod: 26,59,RC

## 2020-11-03 PROCEDURE — 92928 PRQ TCAT PLMT NTRAC ST 1 LES: CPT | Mod: 59,RC

## 2020-11-03 RX ORDER — MORPHINE SULFATE 50 MG/1
2 CAPSULE, EXTENDED RELEASE ORAL ONCE
Refills: 0 | Status: DISCONTINUED | OUTPATIENT
Start: 2020-11-03 | End: 2020-11-03

## 2020-11-03 RX ORDER — ASPIRIN/CALCIUM CARB/MAGNESIUM 324 MG
81 TABLET ORAL ONCE
Refills: 0 | Status: COMPLETED | OUTPATIENT
Start: 2020-11-03 | End: 2020-11-04

## 2020-11-03 RX ORDER — DEXTROSE 50 % IN WATER 50 %
15 SYRINGE (ML) INTRAVENOUS ONCE
Refills: 0 | Status: DISCONTINUED | OUTPATIENT
Start: 2020-11-03 | End: 2020-11-04

## 2020-11-03 RX ORDER — SODIUM CHLORIDE 9 MG/ML
1000 INJECTION, SOLUTION INTRAVENOUS
Refills: 0 | Status: DISCONTINUED | OUTPATIENT
Start: 2020-11-03 | End: 2020-11-04

## 2020-11-03 RX ORDER — DEXTROSE 50 % IN WATER 50 %
12.5 SYRINGE (ML) INTRAVENOUS ONCE
Refills: 0 | Status: DISCONTINUED | OUTPATIENT
Start: 2020-11-03 | End: 2020-11-04

## 2020-11-03 RX ORDER — ATORVASTATIN CALCIUM 80 MG/1
80 TABLET, FILM COATED ORAL AT BEDTIME
Refills: 0 | Status: DISCONTINUED | OUTPATIENT
Start: 2020-11-03 | End: 2020-11-04

## 2020-11-03 RX ORDER — LISINOPRIL 2.5 MG/1
5 TABLET ORAL DAILY
Refills: 0 | Status: DISCONTINUED | OUTPATIENT
Start: 2020-11-04 | End: 2020-11-04

## 2020-11-03 RX ORDER — METOPROLOL TARTRATE 50 MG
50 TABLET ORAL DAILY
Refills: 0 | Status: DISCONTINUED | OUTPATIENT
Start: 2020-11-03 | End: 2020-11-04

## 2020-11-03 RX ORDER — AMLODIPINE BESYLATE 2.5 MG/1
2.5 TABLET ORAL DAILY
Refills: 0 | Status: DISCONTINUED | OUTPATIENT
Start: 2020-11-03 | End: 2020-11-04

## 2020-11-03 RX ORDER — SODIUM CHLORIDE 9 MG/ML
500 INJECTION INTRAMUSCULAR; INTRAVENOUS; SUBCUTANEOUS
Refills: 0 | Status: DISCONTINUED | OUTPATIENT
Start: 2020-11-03 | End: 2020-11-03

## 2020-11-03 RX ORDER — DEXTROSE 50 % IN WATER 50 %
25 SYRINGE (ML) INTRAVENOUS ONCE
Refills: 0 | Status: DISCONTINUED | OUTPATIENT
Start: 2020-11-03 | End: 2020-11-04

## 2020-11-03 RX ORDER — GLUCAGON INJECTION, SOLUTION 0.5 MG/.1ML
1 INJECTION, SOLUTION SUBCUTANEOUS ONCE
Refills: 0 | Status: DISCONTINUED | OUTPATIENT
Start: 2020-11-03 | End: 2020-11-04

## 2020-11-03 RX ORDER — ASPIRIN/CALCIUM CARB/MAGNESIUM 324 MG
81 TABLET ORAL ONCE
Refills: 0 | Status: COMPLETED | OUTPATIENT
Start: 2020-11-03 | End: 2020-11-03

## 2020-11-03 RX ORDER — INSULIN LISPRO 100/ML
VIAL (ML) SUBCUTANEOUS
Refills: 0 | Status: DISCONTINUED | OUTPATIENT
Start: 2020-11-03 | End: 2020-11-04

## 2020-11-03 RX ORDER — CHLORHEXIDINE GLUCONATE 213 G/1000ML
1 SOLUTION TOPICAL ONCE
Refills: 0 | Status: DISCONTINUED | OUTPATIENT
Start: 2020-11-03 | End: 2020-11-03

## 2020-11-03 RX ORDER — PRASUGREL 5 MG/1
10 TABLET, FILM COATED ORAL DAILY
Refills: 0 | Status: DISCONTINUED | OUTPATIENT
Start: 2020-11-03 | End: 2020-11-04

## 2020-11-03 RX ORDER — PRASUGREL 5 MG/1
10 TABLET, FILM COATED ORAL ONCE
Refills: 0 | Status: COMPLETED | OUTPATIENT
Start: 2020-11-03 | End: 2020-11-03

## 2020-11-03 RX ADMIN — ATORVASTATIN CALCIUM 80 MILLIGRAM(S): 80 TABLET, FILM COATED ORAL at 22:27

## 2020-11-03 RX ADMIN — AMLODIPINE BESYLATE 2.5 MILLIGRAM(S): 2.5 TABLET ORAL at 17:53

## 2020-11-03 RX ADMIN — Medication 50 MILLIGRAM(S): at 17:53

## 2020-11-03 RX ADMIN — Medication 81 MILLIGRAM(S): at 09:11

## 2020-11-03 RX ADMIN — SODIUM CHLORIDE 75 MILLILITER(S): 9 INJECTION INTRAMUSCULAR; INTRAVENOUS; SUBCUTANEOUS at 09:12

## 2020-11-03 RX ADMIN — MORPHINE SULFATE 2 MILLIGRAM(S): 50 CAPSULE, EXTENDED RELEASE ORAL at 13:17

## 2020-11-03 RX ADMIN — PRASUGREL 10 MILLIGRAM(S): 5 TABLET, FILM COATED ORAL at 09:12

## 2020-11-03 NOTE — CONSULT NOTE ADULT - SUBJECTIVE AND OBJECTIVE BOX
Preventive Cardiology Consultation Note    CHIEF COMPLAINT: s/p cardiac catheterization requiring cardiovascular prevention optimization and education    HISTORY OF PRESENT ILLNESS: 56yo Male, former smoker, with PMHx HTN, HLD, CAD c/b STEMI 2/2016 at [facility] s/p multiple PCIs (most recently 12/17/19 with TIMMY pRCA, TIMMY dRCA with patent LAD stent and patent OM1 stent) and DM, who presented to his Cardiologist Dr. Mata c/o occasional chest burning with exertion for the past month. Patient is now s/p cardiac catheterization on 11/03/2020: Angiosculpt to p/m/dRCA. TIMMY x 1 dRCA. LM normal. pLAD patent stent. oD1 50%. LCX mild luminal irregularities. OM1 prior stent patent.     Review of systems otherwise negative.     Lifestyle History:  Mediterranean Diet Score (9 question survey) was 5.   (8-9: optimal, 6-7: near-optimal, 4-5: suboptimal, 0-3: markedly suboptimal)  Exercise: Patient reports exercising at a moderate level for 30-60 minutes per week.   Smoking: Former smoker (quit 4 years ago).  Stress: Patient denies any stress.     PAST MEDICAL & SURGICAL HISTORY:  ST elevation MI (STEMI)    Diabetes    CAD (coronary artery disease)  s/p STEMI in 2/2016    Hyperlipidemia    Hypertension    S/P appendectomy  when pt was 18 yo      FAMILY HISTORY:   Patient denies any first degree family history of premature CAD or CVA.     Allergies:   No Known Allergies      HOME MEDICATIONS:   · 	Januvia 100 mg oral tablet: Last Dose Taken: 02-Nov-2020 PM, 1 tab(s) orally once a day  · 	rosuvastatin 20 mg oral tablet: Last Dose Taken: 02-Nov-2020 AM, 1 tab(s) orally once a day (at bedtime)   · 	Effient 10 mg oral tablet: Last Dose Taken: 02-Nov-2020 AM, 1 tab(s) orally once a day  · 	aspirin 81 mg oral tablet: Last Dose Taken: 02-Nov-2020 AM, 1 tab(s) orally once a day  · 	Toprol-XL 50 mg oral tablet, extended release: Last Dose Taken: 02-Nov-2020 AM, 1 tab(s) orally once a day  · 	amLODIPine 2.5 mg oral tablet: Last Dose Taken: 02-Nov-2020 AM, 1 tab(s) orally once a day  · 	ramipril 1.25 mg oral capsule: Last Dose Taken: 02-Nov-2020 AM, 1 cap(s) orally once a day  · 	metFORMIN 1000 mg oral tablet: Last Dose Taken: 02-Nov-2020 PM, 1 tab(s) orally 2 times a day  	HOLD for 48 hours. Restart on 12/20/19    PHYSICAL EXAM:  T(C): 36.4 (11-04-20 @ 10:07), Max: 36.9 (11-03-20 @ 22:12)  T(F): 97.5 (11-04-20 @ 10:07), Max: 98.4 (11-03-20 @ 22:12)  HR: 83 (11-04-20 @ 09:00) (64 - 84)  BP: 115/76 (11-04-20 @ 09:00) (115/76 - 131/84)  RR: 16 (11-04-20 @ 09:00) (16 - 18)  SpO2: 97% (11-04-20 @ 09:00) (96% - 99%)  Height (cm): 165.1 (11-03 @ 17:28)  Weight (kg): 70.4 (11-03 @ 17:28)  BMI (kg/m2): 25.8 (11-03 @ 17:28)  	  Gen- awake, conversive  Head-NCAT; eyes: no corneal arcus noted b/l; no xanthelasmas   Neck- no thyromegaly, no cervical LAD, no JVD, no carotid bruit b/l  Respiratory- good air entry b/l, no WRR  Cardiovascular- S1S2, RRR, no MRG appr, no LE edema b/l, distal pulses 2+ b/l  Gastrointestinal- no HSM, no masses  Neurology- moves all extremities, no focal deficits  Psych- normal affect, non-depressed mood  Skin- no lesions, no rashes, no xanthomas     LABS:	                        14.1   7.40  )-----------( 245      ( 04 Nov 2020 06:24 )             41.1     11-04    138  |  99  |  17  ----------------------------<  169<H>  3.7   |  25  |  1.32<H>    Ca    9.5      04 Nov 2020 06:24  Mg     1.9     11-04    TPro  7.1  /  Alb  4.2  /  TBili  0.8  /  DBili  x   /  AST  35  /  ALT  51<H>  /  AlkPhos  53  11-04      Cholesterol, Serum: 134 mg/dL (11-03 @ 08:37)  HDL Cholesterol, Serum: 32 mg/dL (11-03 @ 08:37)  Triglycerides, Serum: 240 mg/dL (11-03 @ 08:37)  LDL Cholesterol, Serum: 69 mg/dL (11-03 @ 08:37)    A1c 8.4%    ASSESSMENT/RECOMMENDATIONS: 	  Patient's dietary, exercise and overall lifestyle habits were reviewed. The concept of atherosclerosis and its systemic nature was discussed with a focus on the need to get all cardiovascular risk factors to goal. At this time, I would like to make the following recommendations to optimize atherosclerotic risk factors.     RECOMMENDATIONS:   Anti-platelet Therapy: DAPT per interventionalist recommendation.   Lipid Therapy: Due to patient's elevated triglycerides, his LDL level is higher than reported by the lab today. The Maximiliano/Michaela calculation uses a personalized rather than fixed conversion factor to calculate LDL-C more accurately. Using this calculation, his LDL level is 69 mg/dL. Patient is currently taking rosuvastatin 20mg and is compliant and tolerating it well. Along with lifestyle modifications, we believe it would be reasonable to increase the statin dosage for better lipid control.   Hypertension: Blood pressures during this stay were well-controlled.   Mediterranean Diet Score is 5. Some suggestions include continue incorporating 2 or more servings per day of vegetables, fruits, and whole grains. Increase intake of fish and legumes/beans to 2 or more servings per week. Aim to increase intake of healthy fats, such as olive oil and avocados, and have a handful of nuts/seeds most days. Reduce red/processed meat consumption to 2 or fewer times per week.   Exercise: Recommended gradually increasing activity to 30-45 minutes most days of the week once cleared by referring cardiologist. Cardiac rehab might benefit this patient and is covered by major insurance plans (other than co-pays), please refer.   Medication Adherence: Patient has no issues with adherence at this time.   Smoking: This patient is a non-smoker.   Obesity/Overweight: The patient was advised about specific mechanisms such as reduced portions and increased activity for efforts toward weight loss.   Glucometabolic State: Patient's blood sugar is not well-controlled on the current regimen at this time. HbA1c today was 8.4%. Depending on discussions with patient's PCP and/or endocrinologist, we would recommend the possibility of a GLP-1 agonist or SGLT-2 inhibitor, as these newer agents have cardiovascular benefits as well as glucose control. The goal is to transition to more guideline-based cardioprotective agents that will reduce the risk of recurrent CV events.  Sleep Apnea: The patient is at low risk for sleep apnea.   Psychological Stress: The patient appears to be coping with stressors well at this time.     Thank you for the opportunity to see this patient. Please feel free to contact Prevention if there are any questions, or if you feel that your patient would benefit from continued follow-up visits with the Program.    Radha Cardenas, [facility]-BC  Cardiovascular Prevention     Elsa Youssef MD  System Director, Cardiovascular Prevention

## 2020-11-04 ENCOUNTER — TRANSCRIPTION ENCOUNTER (OUTPATIENT)
Age: 55
End: 2020-11-04

## 2020-11-04 VITALS — TEMPERATURE: 98 F

## 2020-11-04 LAB
ALBUMIN SERPL ELPH-MCNC: 4.2 G/DL — SIGNIFICANT CHANGE UP (ref 3.3–5)
ALP SERPL-CCNC: 53 U/L — SIGNIFICANT CHANGE UP (ref 40–120)
ALT FLD-CCNC: 51 U/L — HIGH (ref 10–45)
ANION GAP SERPL CALC-SCNC: 14 MMOL/L — SIGNIFICANT CHANGE UP (ref 5–17)
AST SERPL-CCNC: 35 U/L — SIGNIFICANT CHANGE UP (ref 10–40)
BILIRUB SERPL-MCNC: 0.8 MG/DL — SIGNIFICANT CHANGE UP (ref 0.2–1.2)
BUN SERPL-MCNC: 17 MG/DL — SIGNIFICANT CHANGE UP (ref 7–23)
CALCIUM SERPL-MCNC: 9.5 MG/DL — SIGNIFICANT CHANGE UP (ref 8.4–10.5)
CHLORIDE SERPL-SCNC: 99 MMOL/L — SIGNIFICANT CHANGE UP (ref 96–108)
CO2 SERPL-SCNC: 25 MMOL/L — SIGNIFICANT CHANGE UP (ref 22–31)
CREAT SERPL-MCNC: 1.32 MG/DL — HIGH (ref 0.5–1.3)
GLUCOSE BLDC GLUCOMTR-MCNC: 164 MG/DL — HIGH (ref 70–99)
GLUCOSE BLDC GLUCOMTR-MCNC: 208 MG/DL — HIGH (ref 70–99)
GLUCOSE SERPL-MCNC: 169 MG/DL — HIGH (ref 70–99)
HCT VFR BLD CALC: 41.1 % — SIGNIFICANT CHANGE UP (ref 39–50)
HGB BLD-MCNC: 14.1 G/DL — SIGNIFICANT CHANGE UP (ref 13–17)
MAGNESIUM SERPL-MCNC: 1.9 MG/DL — SIGNIFICANT CHANGE UP (ref 1.6–2.6)
MCHC RBC-ENTMCNC: 28.8 PG — SIGNIFICANT CHANGE UP (ref 27–34)
MCHC RBC-ENTMCNC: 34.3 GM/DL — SIGNIFICANT CHANGE UP (ref 32–36)
MCV RBC AUTO: 83.9 FL — SIGNIFICANT CHANGE UP (ref 80–100)
NRBC # BLD: 0 /100 WBCS — SIGNIFICANT CHANGE UP (ref 0–0)
PLATELET # BLD AUTO: 245 K/UL — SIGNIFICANT CHANGE UP (ref 150–400)
POTASSIUM SERPL-MCNC: 3.7 MMOL/L — SIGNIFICANT CHANGE UP (ref 3.5–5.3)
POTASSIUM SERPL-SCNC: 3.7 MMOL/L — SIGNIFICANT CHANGE UP (ref 3.5–5.3)
PROT SERPL-MCNC: 7.1 G/DL — SIGNIFICANT CHANGE UP (ref 6–8.3)
RBC # BLD: 4.9 M/UL — SIGNIFICANT CHANGE UP (ref 4.2–5.8)
RBC # FLD: 13 % — SIGNIFICANT CHANGE UP (ref 10.3–14.5)
SODIUM SERPL-SCNC: 138 MMOL/L — SIGNIFICANT CHANGE UP (ref 135–145)
WBC # BLD: 7.4 K/UL — SIGNIFICANT CHANGE UP (ref 3.8–10.5)
WBC # FLD AUTO: 7.4 K/UL — SIGNIFICANT CHANGE UP (ref 3.8–10.5)

## 2020-11-04 RX ORDER — PRASUGREL 5 MG/1
1 TABLET, FILM COATED ORAL
Qty: 30 | Refills: 11
Start: 2020-11-04 | End: 2021-10-29

## 2020-11-04 RX ORDER — METFORMIN HYDROCHLORIDE 850 MG/1
1 TABLET ORAL
Qty: 0 | Refills: 0 | DISCHARGE

## 2020-11-04 RX ORDER — MAGNESIUM OXIDE 400 MG ORAL TABLET 241.3 MG
800 TABLET ORAL ONCE
Refills: 0 | Status: COMPLETED | OUTPATIENT
Start: 2020-11-04 | End: 2020-11-04

## 2020-11-04 RX ORDER — ASPIRIN/CALCIUM CARB/MAGNESIUM 324 MG
1 TABLET ORAL
Qty: 30 | Refills: 11
Start: 2020-11-04 | End: 2021-10-29

## 2020-11-04 RX ORDER — POTASSIUM CHLORIDE 20 MEQ
40 PACKET (EA) ORAL ONCE
Refills: 0 | Status: COMPLETED | OUTPATIENT
Start: 2020-11-04 | End: 2020-11-04

## 2020-11-04 RX ADMIN — AMLODIPINE BESYLATE 2.5 MILLIGRAM(S): 2.5 TABLET ORAL at 05:51

## 2020-11-04 RX ADMIN — Medication 81 MILLIGRAM(S): at 11:34

## 2020-11-04 RX ADMIN — Medication 50 MILLIGRAM(S): at 05:51

## 2020-11-04 RX ADMIN — Medication 2: at 12:33

## 2020-11-04 RX ADMIN — MAGNESIUM OXIDE 400 MG ORAL TABLET 800 MILLIGRAM(S): 241.3 TABLET ORAL at 10:03

## 2020-11-04 RX ADMIN — Medication 40 MILLIEQUIVALENT(S): at 10:03

## 2020-11-04 RX ADMIN — PRASUGREL 10 MILLIGRAM(S): 5 TABLET, FILM COATED ORAL at 10:04

## 2020-11-04 RX ADMIN — Medication 1: at 07:43

## 2020-11-04 NOTE — DISCHARGE NOTE PROVIDER - NSDCFUADDINST_GEN_ALL_CORE_FT
You underwent a cardiac angiogram and should wait 3 days before returning to ordinary activities. Do not drive for 2 days. Consult your doctor before returning to vigorous activity. You may return to work in 3-5 days. The catheter from your right wrist was removed. You may shower once the dressing is removed, but avoid baths, hot tubs, or swimming for 5 days to prevent infection. If you notice bleeding from the site, hardening and pain at the site, drainage or redness from the site, coolness/paleness of the right arm  weakness/paralysis of right arm (unable to lift or move) swelling, or fever, please call [phone]. Please continue your Aspirin and Effient  as prescribed unless otherwise indicated by your cardiologist. Please follow up with Dr. Mata in 1-2 weeks. All of your needed prescriptions have been sent electronically to your pharmacy.

## 2020-11-04 NOTE — DISCHARGE NOTE NURSING/CASE MANAGEMENT/SOCIAL WORK - PATIENT PORTAL LINK FT
You can access the FollowMyHealth Patient Portal offered by [facility] by registering at the following website: [email or web address]. By joining [facility]’s FollowMyHealth portal, you will also be able to view your health information using other applications (apps) compatible with our system.

## 2020-11-04 NOTE — DISCHARGE NOTE PROVIDER - HOSPITAL COURSE
54 yo Male, former smoker, with PMHx HTN, HLD, CAD s/p STEMI 2/2016 at [facility] s/p multiple PCIs (most recently 12/17/19 with TIMMY pRCA, TIMMY dRCA with patent LAD stent and patent OM1 stent) and DM Type II, who presented to his Cardiologist Dr. Mata for routine visit c/o occasional episode of substernal chest burning with brisk walking or going up flight of stairs over the past month. He also endorses associated L elbow numbness/tingling and SOB, that both resolve when chest discomfort resolves. Pt reports that these symptoms are much milder than when he presented with STEMI. Exercise Stress Test (10/6/20) revealing EF 55% with small amount of ischemia in the basal to mid inferior, inferolateral regions. Patient presented for cardiac cath with possible intervention due to patients risk factors, known CAD and CCS Angina Class III Symptoms in the setting of an abnormal exercise stress test. Patient s/p cardiac catheterization on 11/03/2020: Angiosculpt to p/m/dRCA. TIMMY x 1 dRCA. LM normal. pLAD patent stent. oD1 50%. LCX mild luminal irregularities. OM1 prior stent patent. EF 55%. Patient experienced 7/10 chest pain intra and post procedure, EKGs unchanged. Morphine 2 mg IV x 1 given with improvement in chest pain. Dr. Mirza aware. Patient has ambulated with no recurrence of chest pain and has no complaints. Labs, telemetry reviewed. Hypokalemia K+3.7 Kdur 40 mEq PO x 1 given. Hypomagnesemia Magnesium 1.9-Mag Ox 800 mg Po x 1 given. Patient C/P free and HD stable and cleared for discharge by Dr. Grubbs. Patient has been given appropriate discharge instructions including medication regimen, access site management and follow up. Prescriptions have been e-prescribed to patient's preferred pharmacy.  BP: 120-130’s/70-80s HR: 60-80s RR :16 Temp: 97.4 F O2 sat 96% RA   General: Laying in bed. NAD  CVS: +S1 S2. RRR. No murmurs.  Lungs: CTA Bilaterally. No rhonchi, wheezes, rales  Abd: BS x 4. Soft NT/ND  Ext: No clubbing, cyanosis, edema or calf tenderness BLE.   Right Radial: Radial Pulse 2+. No hematoma or bleed. Capillary refill < 2 sec. 5/5  Strength.

## 2020-11-04 NOTE — DISCHARGE NOTE PROVIDER - PROVIDER TOKENS
FREE:[LAST:[Adolfo],FIRST:[Jerad],PHONE:[phone]],FAX:[(   [phone]    -],ADDRESS:[[facility]]]

## 2020-11-04 NOTE — DISCHARGE NOTE PROVIDER - NSDCCPCAREPLAN_GEN_ALL_CORE_FT
PRINCIPAL DISCHARGE DIAGNOSIS  Diagnosis: Coronary artery disease  Assessment and Plan of Treatment: You had a cardiac angiogram with stent placement to one of your heart arteries (distal Right Coronary Artery)  with balloon to your proximal, mid and distal RCA. Continue Aspirin and Effient.  DO NOT STOP TAKING ASPIRIN OR Effient UNLESS INSTRUCTED BY YOUR CARDIOLOGIST AS YOUR STENT CAN CLOSE RESULTING IN HEART ATTACK AND DEATH. Follow-up with Dr. Mata in 1-2 weeks      SECONDARY DISCHARGE DIAGNOSES  Diagnosis: Hypertension  Assessment and Plan of Treatment: Maintain Low Salt Diet Less than 2000 mg-2 g daily. Continue Amlodipine, Ramipril and Toprol XL.    Diagnosis: Hyperlipidemia  Assessment and Plan of Treatment: Maintain Low Cholesterol Diet. Continue Rosuvastatin.    Diagnosis: Diabetes mellitus type II, non insulin dependent  Assessment and Plan of Treatment: Monitor Fingersticks before meals and at bedtime. Continue Januvia. DO NOT TAKE METFORMIN UNTIL 11/6/2020 (FRIDAY). Your Hemoglobin A1C (average measurement of how well controlled your sugar levels are over a three month period) is 8.4%. Goal Hemoglobin A1C <7%. Continue current medications and decrease amount of carbohydrates in diet ( rice, pasta, bread,etc).  Follow-up with Primary Care Physician in 1-2 weeks.

## 2020-11-04 NOTE — DISCHARGE NOTE PROVIDER - NSDCMRMEDTOKEN_GEN_ALL_CORE_FT
amLODIPine 2.5 mg oral tablet: 1 tab(s) orally once a day  Aspirin Enteric Coated 81 mg oral delayed release tablet: 1 tab(s) orally once a day   Effient 10 mg oral tablet: 1 tab(s) orally once a day  Januvia 100 mg oral tablet: 1 tab(s) orally once a day  metFORMIN 1000 mg oral tablet: 1 tab(s) orally 2 times a day  HOLD for 48 hours.   ramipril 1.25 mg oral capsule: 1 cap(s) orally once a day  rosuvastatin 20 mg oral tablet: 1 tab(s) orally once a day (at bedtime)   Toprol-XL 50 mg oral tablet, extended release: 1 tab(s) orally once a day

## 2020-11-09 DIAGNOSIS — E11.9 TYPE 2 DIABETES MELLITUS WITHOUT COMPLICATIONS: ICD-10-CM

## 2020-11-09 DIAGNOSIS — Z87.891 PERSONAL HISTORY OF NICOTINE DEPENDENCE: ICD-10-CM
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
## 2020-11-09 DIAGNOSIS — R07.9 CHEST PAIN, UNSPECIFIED: ICD-10-CM

## 2020-11-09 DIAGNOSIS — T82.855A STENOSIS OF CORONARY ARTERY STENT, INITIAL ENCOUNTER: ICD-10-CM

## 2020-11-09 DIAGNOSIS — E87.6 HYPOKALEMIA: ICD-10-CM

## 2020-11-09 DIAGNOSIS — E78.5 HYPERLIPIDEMIA, UNSPECIFIED: ICD-10-CM

## 2020-11-09 DIAGNOSIS — I25.2 OLD MYOCARDIAL INFARCTION: ICD-10-CM

## 2020-11-09 DIAGNOSIS — I25.10 ATHEROSCLEROTIC HEART DISEASE OF NATIVE CORONARY ARTERY WITHOUT ANGINA PECTORIS: ICD-10-CM

## 2020-11-09 DIAGNOSIS — Z95.5 PRESENCE OF CORONARY ANGIOPLASTY IMPLANT AND GRAFT: ICD-10-CM

## 2020-11-09 DIAGNOSIS — I10 ESSENTIAL (PRIMARY) HYPERTENSION: ICD-10-CM

## 2020-11-09 DIAGNOSIS — E83.42 HYPOMAGNESEMIA: ICD-10-CM
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
## 2020-11-17 PROCEDURE — C1894: CPT

## 2020-11-17 PROCEDURE — 80053 COMPREHEN METABOLIC PANEL: CPT

## 2020-11-17 PROCEDURE — 85025 COMPLETE CBC W/AUTO DIFF WBC: CPT

## 2020-11-17 PROCEDURE — 36415 COLL VENOUS BLD VENIPUNCTURE: CPT

## 2020-11-17 PROCEDURE — 85610 PROTHROMBIN TIME: CPT

## 2020-11-17 PROCEDURE — C1874: CPT

## 2020-11-17 PROCEDURE — C1753: CPT

## 2020-11-17 PROCEDURE — 83036 HEMOGLOBIN GLYCOSYLATED A1C: CPT

## 2020-11-17 PROCEDURE — 85730 THROMBOPLASTIN TIME PARTIAL: CPT

## 2020-11-17 PROCEDURE — 83735 ASSAY OF MAGNESIUM: CPT

## 2020-11-17 PROCEDURE — C1887: CPT

## 2020-11-17 PROCEDURE — C1725: CPT

## 2020-11-17 PROCEDURE — 82962 GLUCOSE BLOOD TEST: CPT

## 2020-11-17 PROCEDURE — C1769: CPT

## 2020-11-17 PROCEDURE — 82550 ASSAY OF CK (CPK): CPT

## 2020-11-17 PROCEDURE — 85027 COMPLETE CBC AUTOMATED: CPT

## 2020-11-17 PROCEDURE — 93005 ELECTROCARDIOGRAM TRACING: CPT

## 2020-11-17 PROCEDURE — 80061 LIPID PANEL: CPT

## 2020-11-17 PROCEDURE — 82553 CREATINE MB FRACTION: CPT

## 2024-10-24 NOTE — PATIENT PROFILE ADULT - DO YOU FEEL THREATENED BY OTHERS?
[Cough] : cough [Abdominal Pain] : abdominal pain [Constipation] : constipation [Heartburn] : heartburn [Negative] : Musculoskeletal no